# Patient Record
(demographics unavailable — no encounter records)

---

## 2024-12-20 NOTE — REVIEW OF SYSTEMS
[Joint Pain] : joint pain [Depression] : depression [Anxiety] : anxiety [Negative] : Heme/Lymph [Memory Lapses Or Loss] : memory lapses or loss

## 2024-12-20 NOTE — HISTORY OF PRESENT ILLNESS
[FreeTextEntry1] : Doing ok No CP KENNEDY -.> doesn't do much -. mostly in bed  Has noticed some memory difficulty within the last few months  Still planning on left shoulder surgery -=. can only do it in the summer , because that;s when her children can come